# Patient Record
Sex: FEMALE | URBAN - METROPOLITAN AREA
[De-identification: names, ages, dates, MRNs, and addresses within clinical notes are randomized per-mention and may not be internally consistent; named-entity substitution may affect disease eponyms.]

---

## 2020-02-07 ENCOUNTER — ALLIED HEALTH/NURSE VISIT (OUTPATIENT)
Dept: TRANSPLANT | Facility: CLINIC | Age: 40
End: 2020-02-07
Attending: INTERNAL MEDICINE
Payer: MEDICARE

## 2020-02-07 ENCOUNTER — MEDICAL CORRESPONDENCE (OUTPATIENT)
Dept: TRANSPLANT | Facility: CLINIC | Age: 40
End: 2020-02-07

## 2020-02-07 VITALS
SYSTOLIC BLOOD PRESSURE: 112 MMHG | TEMPERATURE: 98.9 F | BODY MASS INDEX: 51.54 KG/M2 | DIASTOLIC BLOOD PRESSURE: 72 MMHG | OXYGEN SATURATION: 96 % | WEIGHT: 290.9 LBS | RESPIRATION RATE: 16 BRPM | HEIGHT: 63 IN | HEART RATE: 70 BPM

## 2020-02-07 DIAGNOSIS — D46.Z OTHER MYELODYSPLASTIC SYNDROMES (H): Primary | ICD-10-CM

## 2020-02-07 DIAGNOSIS — D46.9 MDS (MYELODYSPLASTIC SYNDROME) (H): Primary | ICD-10-CM

## 2020-02-07 DIAGNOSIS — Z71.9 VISIT FOR COUNSELING: Primary | ICD-10-CM

## 2020-02-07 PROBLEM — Z97.5 NEXPLANON IN PLACE: Status: ACTIVE | Noted: 2017-02-23

## 2020-02-07 PROBLEM — D63.0 ANEMIA IN NEOPLASTIC DISEASE: Status: ACTIVE | Noted: 2017-06-13

## 2020-02-07 PROBLEM — R52 PAIN MANAGEMENT: Status: ACTIVE | Noted: 2017-11-30

## 2020-02-07 PROBLEM — F33.0 MILD RECURRENT MAJOR DEPRESSION (H): Status: ACTIVE | Noted: 2019-04-29

## 2020-02-07 PROBLEM — N76.4 ABSCESS OF RIGHT GENITAL LABIA: Status: ACTIVE | Noted: 2018-03-20

## 2020-02-07 PROBLEM — R87.619 ABNORMAL CERVICAL PAPANICOLAOU SMEAR: Status: ACTIVE | Noted: 2017-11-14

## 2020-02-07 PROBLEM — F41.8 OTHER SPECIFIED ANXIETY DISORDERS: Status: ACTIVE | Noted: 2018-11-13

## 2020-02-07 PROBLEM — G89.18 POST-OPERATIVE PAIN: Status: ACTIVE | Noted: 2017-11-30

## 2020-02-07 PROBLEM — E83.19 HEMOSIDEROSIS: Status: ACTIVE | Noted: 2019-10-22

## 2020-02-07 PROBLEM — Z95.828 STATUS POST PICC CENTRAL LINE PLACEMENT: Status: ACTIVE | Noted: 2017-10-19

## 2020-02-07 PROCEDURE — 25000128 H RX IP 250 OP 636: Mod: ZF | Performed by: INTERNAL MEDICINE

## 2020-02-07 PROCEDURE — 40000268 ZZH STATISTIC NO CHARGES: Mod: ZF

## 2020-02-07 PROCEDURE — 81382 HLA II TYPING 1 LOC HR: CPT | Mod: XU | Performed by: INTERNAL MEDICINE

## 2020-02-07 PROCEDURE — 86828 HLA CLASS I&II ANTIBODY QUAL: CPT | Performed by: INTERNAL MEDICINE

## 2020-02-07 PROCEDURE — 81370 HLA I & II TYPING LR: CPT | Performed by: INTERNAL MEDICINE

## 2020-02-07 PROCEDURE — 81378 HLA I & II TYPING HR: CPT | Performed by: INTERNAL MEDICINE

## 2020-02-07 PROCEDURE — G0463 HOSPITAL OUTPT CLINIC VISIT: HCPCS | Mod: ZF

## 2020-02-07 PROCEDURE — 81376 HLA II TYPING 1 LOCUS LR: CPT | Performed by: INTERNAL MEDICINE

## 2020-02-07 RX ORDER — DEFEROXAMINE MESYLATE 2 G/1
INJECTION, POWDER, LYOPHILIZED, FOR SOLUTION INTRAMUSCULAR; INTRAVENOUS; SUBCUTANEOUS
COMMUNITY
Start: 2019-09-09

## 2020-02-07 RX ORDER — LOPERAMIDE HCL 2 MG
2-4 CAPSULE ORAL
COMMUNITY
Start: 2017-04-12

## 2020-02-07 RX ORDER — MORPHINE SULFATE 15 MG/1
15 TABLET, FILM COATED, EXTENDED RELEASE ORAL
COMMUNITY
Start: 2019-10-04 | End: 2020-02-07

## 2020-02-07 RX ORDER — AMOXICILLIN 250 MG
1 CAPSULE ORAL
COMMUNITY
Start: 2017-12-23

## 2020-02-07 RX ORDER — DIPHENHYDRAMINE HCL 25 MG
25-50 CAPSULE ORAL
COMMUNITY

## 2020-02-07 RX ORDER — ONDANSETRON 8 MG/1
8 TABLET, FILM COATED ORAL
COMMUNITY
Start: 2018-12-18

## 2020-02-07 RX ORDER — GABAPENTIN 300 MG/1
CAPSULE ORAL
COMMUNITY
Start: 2019-04-04

## 2020-02-07 RX ORDER — SERTRALINE HYDROCHLORIDE 100 MG/1
200 TABLET, FILM COATED ORAL
COMMUNITY
Start: 2019-06-07 | End: 2020-02-07

## 2020-02-07 RX ORDER — DIPHENOXYLATE HYDROCHLORIDE AND ATROPINE SULFATE 2.5; .025 MG/1; MG/1
1 TABLET ORAL
COMMUNITY

## 2020-02-07 RX ORDER — TRIAMCINOLONE ACETONIDE 1 MG/ML
LOTION TOPICAL
COMMUNITY
Start: 2018-07-06

## 2020-02-07 RX ORDER — PROCHLORPERAZINE MALEATE 10 MG
10 TABLET ORAL
COMMUNITY
Start: 2019-01-17

## 2020-02-07 RX ORDER — ACYCLOVIR 400 MG/1
TABLET ORAL
COMMUNITY
Start: 2019-06-19

## 2020-02-07 RX ORDER — TRAMADOL HYDROCHLORIDE 50 MG/1
50 TABLET ORAL
COMMUNITY
Start: 2019-09-09

## 2020-02-07 RX ORDER — ALBUTEROL SULFATE 90 UG/1
1-2 AEROSOL, METERED RESPIRATORY (INHALATION)
COMMUNITY
Start: 2017-01-18

## 2020-02-07 RX ORDER — TEMAZEPAM 15 MG/1
15 CAPSULE ORAL
COMMUNITY
Start: 2019-10-11

## 2020-02-07 RX ORDER — ARIPIPRAZOLE 2 MG/1
2 TABLET ORAL
COMMUNITY
Start: 2019-06-07

## 2020-02-07 RX ORDER — POSACONAZOLE 100 MG/1
TABLET, DELAYED RELEASE ORAL
COMMUNITY
Start: 2018-07-23 | End: 2020-02-07

## 2020-02-07 RX ORDER — NARATRIPTAN 2.5 MG/1
2.5 TABLET ORAL
COMMUNITY
Start: 2019-04-15

## 2020-02-07 RX ORDER — CETIRIZINE HYDROCHLORIDE 10 MG/1
TABLET ORAL
COMMUNITY
Start: 2019-08-06

## 2020-02-07 RX ORDER — MULTIVITAMIN
1 CAPSULE ORAL
COMMUNITY
End: 2020-02-07

## 2020-02-07 RX ORDER — LANOLIN ALCOHOL/MO/W.PET/CERES
100 CREAM (GRAM) TOPICAL
COMMUNITY

## 2020-02-07 RX ORDER — TOPIRAMATE 50 MG/1
75 TABLET, FILM COATED ORAL 2 TIMES DAILY
Refills: 0 | COMMUNITY
Start: 2020-02-07

## 2020-02-07 RX ORDER — HEPARIN SODIUM (PORCINE) LOCK FLUSH IV SOLN 100 UNIT/ML 100 UNIT/ML
5 SOLUTION INTRAVENOUS EVERY 8 HOURS
Status: DISCONTINUED | OUTPATIENT
Start: 2020-02-07 | End: 2020-02-10 | Stop reason: HOSPADM

## 2020-02-07 RX ORDER — TRAZODONE HYDROCHLORIDE 100 MG/1
TABLET ORAL
COMMUNITY
Start: 2019-06-07

## 2020-02-07 RX ORDER — LACTOBACILLUS RHAMNOSUS GG 10B CELL
CAPSULE ORAL
COMMUNITY

## 2020-02-07 RX ORDER — CLINDAMYCIN PHOSPHATE 11.9 MG/ML
SOLUTION TOPICAL
COMMUNITY
Start: 2008-01-24

## 2020-02-07 RX ORDER — BENZOYL PEROXIDE 5 G/100G
GEL TOPICAL
COMMUNITY
Start: 2019-02-04

## 2020-02-07 RX ORDER — LANOLIN ALCOHOL/MO/W.PET/CERES
100 CREAM (GRAM) TOPICAL
COMMUNITY
End: 2020-02-07

## 2020-02-07 RX ORDER — GLUCOSAMINE HCL 500 MG
1 TABLET ORAL
COMMUNITY
Start: 2020-01-22 | End: 2020-02-07

## 2020-02-07 RX ORDER — ADAPALENE 45 G/G
1 GEL TOPICAL
COMMUNITY
Start: 2008-01-24

## 2020-02-07 RX ORDER — DIPHENHYDRAMINE HYDROCHLORIDE 25 MG/1
25 TABLET ORAL
COMMUNITY
Start: 2014-04-17

## 2020-02-07 RX ORDER — LIDOCAINE/PRILOCAINE 2.5 %-2.5%
CREAM (GRAM) TOPICAL
COMMUNITY
Start: 2019-08-02

## 2020-02-07 RX ORDER — LORAZEPAM 0.5 MG/1
0.5 TABLET ORAL
COMMUNITY
Start: 2019-10-11

## 2020-02-07 RX ORDER — TOPIRAMATE 50 MG/1
75 TABLET, FILM COATED ORAL
COMMUNITY
Start: 2019-03-19 | End: 2020-02-07

## 2020-02-07 RX ADMIN — HEPARIN SODIUM (PORCINE) LOCK FLUSH IV SOLN 100 UNIT/ML 5 ML: 100 SOLUTION at 16:00

## 2020-02-07 ASSESSMENT — PAIN SCALES - GENERAL: PAINLEVEL: MODERATE PAIN (4)

## 2020-02-07 ASSESSMENT — MIFFLIN-ST. JEOR: SCORE: 1971.64

## 2020-02-07 NOTE — NURSING NOTE
"Oncology Rooming Note    February 7, 2020 1:27 PM   Eleanor Mckeon is a 39 year old female who presents for:    Chief Complaint   Patient presents with     Consult     New pt eval- Other myelodysplastic syndromes      Initial Vitals: /72 (BP Location: Right arm, Patient Position: Sitting, Cuff Size: Adult Large)   Pulse 70   Temp 98.9  F (37.2  C) (Oral)   Resp 16   Ht 1.605 m (5' 3.19\")   Wt 132 kg (290 lb 14.4 oz)   SpO2 96%   BMI 51.22 kg/m   Estimated body mass index is 51.22 kg/m  as calculated from the following:    Height as of this encounter: 1.605 m (5' 3.19\").    Weight as of this encounter: 132 kg (290 lb 14.4 oz). Body surface area is 2.43 meters squared.  Moderate Pain (4) Comment: Data Unavailable   No LMP recorded.  Allergies reviewed: Yes  Medications reviewed: Yes    Medications: Medication refills not needed today.  Pharmacy name entered into EPIC: Data Unavailable    Clinical concerns: N/A      Erin Calderon CMA              "

## 2020-02-07 NOTE — PROGRESS NOTES
Date: Feb 7, 2020    Referring Physician:   Cathleen Chen, MD [facility]  [address] [place] [address]     CC: transplant consult for MDS    HPI:     38 yo lady with history of MDS. She reports that in 2016, she started having low blood counts. At that point, she had a work-up that ended up in bone marrow biopsy. She was supposedly hypocellular with normal cytogenetics but had an SF3B1 mutation with 70% ringed sideroblasts.    12/29/16 showed hypocellular with ~40% cellularity, with adequate erythropoiesis and  granulopoiesis, normal to slightly decreased megakaryopoiesis and megaloblastic maturation. Markedly increased stainable storage iron with 70% ring sideroblasts. No abnormal cytogenetics, +SF3B1.    Cycle #1 of 5-AZA 75mg/m2 daily x 7 days, given 02/27/2017 to 03/05/2017  Course complicated by prolonged neutropenic fever, pharyngitis. Hospitalized between 03/23/2017 to 04/10/2017.    Cycle #2 of 5- AZA 75mg/m2 daily x 7 days, given 03/31/2017 to 04/06/2017  Course complicated by neutropenic fever, sepsis due to Right labial abscess/cellulitis with cultures positive for Carbapenem resistant Klebsiella, requiring I&D x 2 on 04/27/2017 and 05/02/2017, C. Diff negative diarrhea, prolonged antibiotics and hospitalization, wound care and healing. Hospitalized between 04/25/2017 to 05/11/2017     Cycle #3 of 5-AZA 75mg/m2 daily x 5 days, given 06/27/2017 to 07/01/2017  A. Course complicated by neutropenic fever, Klebsiella pneumoniae sepsis of unknown source, elevated LFTs. Hospitalizated between 07/16/2017 to 07/21/2017    Cycle #4 of 5-AZA 75mg/m2 daily x 5 days, give 08/15/2017 to 08/19/2017, with no response  Course complicated by neutropenic fever, Left sided pharyngitis. Hospitalized between 09/4/2017 to 09/08/2017.    Bone marrow biopsy:  9/27/17 showed hypocellularity with 10-15% cellularity, increased storage and sideroblastic iron with rare ringed sideroblasts. No  morphologic evidence of significant dyspoiesis was noted. No increase in blasts was identified. Cytogenetics showed del 13q, but no other cytogenetic abnormalities.    Cycle #5 of 5-AZA 75mg/m2 daily x 3 days, given 11/19/2017 to 11/21/2017, with no response  Course complicated by:  I) Neutropenic fever, recurrent Right labial abscess requiring I&D on 11/27/17, broad spectrum antibiotic/antifungal support. Hospitalized between 11/24/2017 to 12/2/2017.  II) Right Calf abscess, developed 12/13/2017, requiring I&D 01/08/2018 and subsequent debridement on 01/18/2018, prolonged IV antibiotic therapy as outpatient.    Cycle #6 of 5-AZA 75mg/m2 daily x 3 days, given 02/14/2018 to 02/16/2018, with no response  A. Course complicated by Left labial abscess, Hospitalized between 03/20/2018 to 03/25/2018    Lenalidomide 10mg daily, discontinued due to worsening pancytopenia with associated neutropenic sepsis, 06/2018 to 07/2018    Bone Marrow Biopsy:  5/8/19 showed: Slightly hypercellular bone marrow for age (approximately 70% cellular overall) displaying adequate erythropoiesis, granulopoiesis and megakaryopoiesis. Mild trilineage dysplasia. Less than 1% bone marrow myeloblasts. Markedly increased stainable storage iron with approximately 3% ring sideroblasts. No circulating myeloblasts identified. Cytogenetics showed del 13q, but no other cytogenetic abnormalities.     A. Myeloid malignancies mutation panel by next generation sequencing showed:   1) ASXL1 c.2838dup, p.Mmb320* (NM_[id number]) Variant Frequency: 30.3%   2) SF3B1 c.1984C>T, p.Gop740Arj (NM_[id number]) Variant Frequency: 5.6%   3) TP53 c.1079G>C, p.Hei365Fbu (NM_000546.5) Variant Frequency: 45.8%   4) BCORL1 c.3350A>G, p.Rsv8212Krn (NM_[id number]) Variant Frequency: 50.0% (Unknown significance)  5) KMT2A c.1981G>A, p.Lrf493Muz (NM_[id number]) Variant Frequency: 48.6% (Unknown significance)    Today:  The only reason I copied the above is to show 2 things: 1. Clonal  evolution and 2. Her significant side effects with chemotherapy.  Her dad is in Hospice care at the moment passing away with heart failure. She and her mom came to the appointment but were frequently tearful during. She reports her energy is low now. She takes some naps throughout the day but she doesn't sleep through the night. She wakes up at 4 and stays awake, then takes some naps here and there. 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 Her appetite is preserved. No fevers. No rashes. No lumps or bumps. No other complaints.    ROS: A 14-point ROS has been performed and is negative unless otherwise specified in the HPI.    PMH:  1. 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 Markedly improved Pancytopenia with ongoing mild thrombocytopenia since 12/2018  3. B12 Deficiency  4. Secondary Iron overload due to multiple transfusions  5. 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 S/P Neutropenic infections as noted below  7. Hypertension  8. Asthma  9. S/P Gastric Bypass, Ruth-En-Y for weight reduction  10. Nexplanon implant in place to control menorrhagia  11. LOLA  12. Idiopathic hypersomnia    PSH:  - Ruth en Y  - Left elbow surgery  - Labial I/D x4, most recently 3/2018  - Skin infection  - D&C in 2017    SH:  Social History     Tobacco Use     Smoking status: Former Smoker     Smokeless tobacco: Never Used   Substance Use Topics     Alcohol use: Yes     Drug use: Never      - E/T/I: quit smoking in 2006. smoked for about 5 years. drinks 1-2 beers every other day. no drugs  - Sib/parents/children Info: brother. no children.  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    FH:  Family History   Problem Relation Age of Onset     Uterine Cancer Maternal Grandmother      Prostate Cancer Maternal Grandfather      Leukemia Maternal Great-Grandfather      ALL:     Allergies    Allergen Reactions     Penicillins Hives     Other reaction(s): Hives  Tolerated cefepime during 9/5/17 admission. Tolerated cefdinir 9/8/17.     As a young child  Tolerated cefepime during 9/5/17 admission. Tolerated cefdinir 9/8/17.     As a young child       Deferasirox Itching and Rash     Gadobutrol Nausea and Vomiting     Nausea and vomiting with administration on TRACEY.  Nausea and vomiting with administration on TRACEY.       Cat Hair Extract Itching and Other (See Comments)     PN: Cat Dander-itchy eyes, sneezing  wheezing       Raspberry Hives     Other reaction(s): Edema,generalized  PN: Facial Swelling  PN: Facial Swelling       Meropenem Rash     Tolerated cefepime during 9/5/17 admission  Tolerated cefepime during 9/5/17 admission       Medications:  Current Outpatient Medications   Medication     acyclovir (ZOVIRAX) 400 MG tablet     adapalene (DIFFERIN) 0.1 % external gel     albuterol (PROAIR HFA/PROVENTIL HFA/VENTOLIN HFA) 108 (90 Base) MCG/ACT inhaler     ARIPiprazole (ABILIFY) 2 MG tablet     benzoyl peroxide 5 % topical gel     biotin 5 MG CAPS     calcium citrate and vitamin D (CITRACAL) 200-250 MG-UNIT TABS per tablet     cetirizine (SM ALL DAY ALLERGY) 10 MG tablet     cholecalciferol (VITAMIN D3) 125 MCG (5000 UT) TABS tablet     clindamycin (CLEOCIN T) 1 % external solution     Cyanocobalamin 1000 MCG/ML KIT     deferoxamine (DESFERAL) 2 g SOLR     diphenhydrAMINE (BENADRYL) 25 MG capsule     etonogestrel (IMPLANON/NEXPLANON) 68 MG IMPL     gabapentin (NEURONTIN) 300 MG capsule     Lactobacillus-Inulin ([facility] DIGEST[facility] HEALTH) CAPS     lidocaine-prilocaine (EMLA) 2.5-2.5 % external cream     loperamide (IMODIUM) 2 MG capsule     LORazepam (ATIVAN) 0.5 MG tablet     Multiple Vitamin (MULTI-VITAMINS) TABS     naloxone (NARCAN) 4 MG/0.1ML nasal spray     naratriptan (AMERGE) 2.5 MG tablet     ondansetron (ZOFRAN) 8 MG tablet     phentermine (LOMAIRA) 8 MG tablet     prochlorperazine  "(COMPAZINE) 10 MG tablet     senna-docusate (SENOKOT-S/PERICOLACE) 8.6-50 MG tablet     temazepam (RESTORIL) 15 MG capsule     topiramate (TOPAMAX) 50 MG tablet     traMADol (ULTRAM) 50 MG tablet     traZODone (DESYREL) 100 MG tablet     triamcinolone (KENALOG) 0.1 % external lotion     vitamin B1 (THIAMINE) 100 MG tablet     vortioxetine (TRINTELLIX/BRINTELLIX) 20 MG tablet     Current Facility-Administered Medications   Medication     heparin 100 UNIT/ML injection 5 mL       Physical Exam:  Vitals: /72 (BP Location: Right arm, Patient Position: Sitting, Cuff Size: Adult Large)   Pulse 70   Temp 98.9  F (37.2  C) (Oral)   Resp 16   Ht 1.605 m (5' 3.19\")   Wt 132 kg (290 lb 14.4 oz)   SpO2 96%   BMI 51.22 kg/m    GA: NAD, appears as stated age  HEENT: EOMI, PERRL, OP clear  Neck: Supple, no LAD  CV: RRR, no m/r/g  Lungs: Clear, no w/c/r  Abd: BS+, soft, NT, ND  Ext: warm and well perfused, trace edema  Neuro: AAO, moves all extremities  Lymphatics: no palpable cervical, axillary, or inguinal LAD. No HSM  Skin: No acute rashes, no lesions  Psyc: appropriate, reactive, tearful    ECOG PS: 1    Labs, pathology and other diagnostics reviewed    12/29/2016 marrow     1. Moderately hypocellular bone marrow with moderately decreased granulopoiesis and megakaryopoiesis, and slightly decreased erythropoiesis with 65% ring sideroblasts. Pancytopenia. See  comment.   2.  Small PNH clone identified by outside flow cytometry studies.  3.  No morphologic or immunophenotypic features of involvement by lymphoma.        Diagnosis Comment    There is a sideroblastic anemia with approximately 65% ring sideroblasts of the erythroid precursors.  Secondary causes of ring sideroblasts including ethanol use, toxin/heavy metal exposure, medication, zinc toxicity, copper deficiency and congenital sideroblastic anemia should be excluded.  If these are excluded, then the overall findings may represent involvement by a low grade " myelodysplastic syndrome with single lineage dysplasia with ring sideroblasts (MDS-SLD-RS).  Sequencing for SF3B1 mutation may be considered if clinically indicated.       1/19/17  DIAGNOSIS:    Peripheral blood, bone marrow aspirate and biopsy, iliac crest:    1.  Slightly hypocellular bone marrow with mild panhypoplasia.    2.  Erythroid precursors showing slight dyserythropoiesis with 50% ring sideroblasts on iron stain.    3.  Slight minimal dysmegakaryopoiesis with occasional osteoclast-like megakaryocytes.  See comment.    Cytogenetic analysis, bone marrow aspirate, submitted report: Normal female karyotype.  46,XX[20].          Paroxysmal nocturnal hemoglobinuria testing by flow cytometry, peripheral blood, submitted report (collected 12/29/16): Subclinical red blood cell and white blood cell PNH clone identified.  % PNE Monocytes:  0.412;  % PNH PMN:  0.595; and  % PNH RBC:  0.088.    Molecular Testing: SF3B1 (27%) and TET2 46% VUS    Bone marrow biopsy:  9/27/17 showed hypocellularity with 10-15% cellularity, increased storage and sideroblastic iron with rare ringed sideroblasts. No morphologic evidence of significant dyspoiesis was noted. No increase in blasts was identified. Cytogenetics showed del 13q, but no other cytogenetic abnormalities.    5/8/2019  Patient with previously diagnosed myelodysplastic syndrome with unilineage dysplasia and ring sideroblasts undergoes bone marrow biopsy showing:      Slightly hypercellular bone marrow for age (approximately 70% cellular overall) displaying adequate erythropoiesis, granulopoiesis and megakaryopoiesis.    Mild trilineage dysplasia.    Less than 1% bone marrow myeloblasts.    Markedly increased stainable storage iron with approximately 3% ring sideroblasts.    Macrocytic red cells with increased anisopoikilocytosis and no evidence of hemolysis.    Mild leukopenia with absolute neutropenia and lymphopenia.    No circulating myeloblasts identified.     Moderate thrombocytopenia    Cytogenetics:  46,XX,del(l3)(q14q22)[18]/46,XX[2] Abnormal female karyotype    A. Myeloid malignancies mutation panel by next generation sequencing showed:   1) ASXL1 c.2838dup, p.Ewp065* (NM_[id number]) Variant Frequency: 30.3%   2) SF3B1 c.1984C>T, p.Epz524Nyx (NM_[id number]) Variant Frequency: 5.6%   3) TP53 c.1079G>C, p.Pgp213Tzq (NM_000546.5) Variant Frequency: 45.8%   4) BCORL1 c.3350A>G, p.Imb8581Vxb (NM_[id number]) Variant Frequency: 50.0% (Unknown significance)  5) KMT2A c.1981G>A, p.Iir273Ehi (NM_[id number]) Variant Frequency: 48.6% (Unknown significance)      ANC on that date=1.2    A/P    1. MDS or other bone marrow failure disorder   - For MDS, her R-IPSS is 4.5 (2 for CG, rest for counts based on her pretreatment CBC copied above). However, given her young age at diagnosis, her clonal evolution with time and minimal chemotherapy, and all the complications she endured while on 5-aza, mentioned of a small PNH clone on the marrow done at [place], and the fact that she started hypocellular, I am more concerned about an inherited/somatic bone marrow failure disorder. We have a Fanconi Anemia genetic panel here but [facility] offers a more comprehensive and myeloid tailored panel. I think it's very important to check for that since some the DNA repair disorders need a lower intensity conditioning prior to transplant and radiation must be avoided in those scenarios. We will go ahead and type her today. We also consented her to the BMT CTN 1702 study (CTRL-ALT-D). She is tri-racial and would probably fall into the very unlikely group of getting a MUD.    HCT-CI = 2 for obesity and anxiety.  DRI = Intermediate    Plan:  - I will discuss her further in our BMT Tumor board  - I will get in touch with Dr. Susan Guerra to see if she is willing to get the UC panel done through her office or if we should arrange it here.    I spent 60 minutes with Eleanor Mckeon with more than 30  minutes in direct face-to-face counseling and coordination of care.

## 2020-02-10 DIAGNOSIS — D46.Z OTHER MYELODYSPLASTIC SYNDROMES (H): Primary | ICD-10-CM

## 2020-02-10 LAB
SCR 1 TEST METHOD: NORMAL
SCR1 CELL: NORMAL
SCR1 COMMENTS: NORMAL
SCR1 RESULT: NORMAL
SCR2 CELL: NORMAL
SCR2 COMMENTS: NORMAL
SCR2 RESULT: NORMAL
SCR2 TEST METHOD: NORMAL

## 2020-02-10 NOTE — PROGRESS NOTES
Met with patient and mother following Geovani Mendoza MD BMT New Evaluation visit.  Discussed the role of Nurse Coordinator and provided patient with business cards for [facility] Bell (BMT ) to be used should additional questions arise.  Signed consent for 2019-18.  Signed Financial Release for URD search.  PRA and HLA ordered.  All questions and concerns addressed, no further questions at this time.  Ester Coffman RN, BSN, OCN  Nurse Coordinator, Adult BMT  [phone]

## 2020-02-11 NOTE — PROGRESS NOTES
"BMT Clinical Social Work New Transplant Evaluation    Information for this evaluation on 02/07/20 was collected via Eleanor \"Fidelina\" (pt) and her mother's report, consultation with medical team, and medical chart review.     Present:  Patient: Eleanor \"Fidelina\" Mike  Pt's mother: Susan Mckeon   Clinical  (CSW):LU Huizar, [facility]    Medical Team:   Nurse Coordinator: Ester Coffman RN  BMT Physician: Geovani Mendoza MD  Referring Physician: Nicol Guerra MD      With whom does pt live: Pt lives with her parents and her brother     Relocation Requirement: Pt lives in [place] and will not be required to relocate    Diagnosis: Myelodysplastic Syndrome (MDS)    Date of Diagnosis: 01/27/17    Transplant Type: Allogeneic    Special Needs:  No needs identified at this time.     Family Constellation:   Spouse: NA  Children: NA  Parents: mother (Susan); father is currently in multiple organ failure in [facility] ICU.   Siblings: 1 brother     Employment  Currently employed: No; on L[employer]  Occupation: Pt previously worked as a [employer]    Source of Income: [employer] and L[employer] benefits     Finances/Insurance:  Pt expressed some financial concern. CSW provided her with [employer] application and discussed financial grants available to transplant patients.     CSW provided information regarding the insurance authorization process and the role of the BMT financial case-mangers. CSW provided contact info for the BMT financial case-managers and referred pt to them for future insurance questions.     Caregiver:  CSW discussed the caregiver role and expectation at length. Fidelina is agreeable to having a full time caregiver for a minimum of 100 days until cleared by the BMT physician. Fidelina's identified caregivers are her mom and brother, with whom she lives. Caregiver education and information provided. No caregiver concerns identified.     Healthcare Directive:  No. CSW provided education and forms. CSW encouraged Fidelina to have " discussions with their family regarding their health care wishes.     Resources Provided:  BMT Information Book   BMT Resources Packet:   Healthcare Directive:   Tour of Unit NO   Transplant unit description and information provided.     Identified Concerns:     Fidelina's father was dying in the ICU of multiple organ failure at the time of the NT. His decline was not anticipated by the family. Patient was understandably upset during the NT. She insisted on hearing all the information, as she says it's what her father would want.      Summary:   Fidelina presents to [facility] for consultation regarding an allogeneic stem cell transplant. Fidelina and her mother asked good questions regarding psychosocial factors related to BMT; all of which were answered. Fidelina presented as emotional and overwhelmed, as her father was dying in the ICU at the time of the NT. Fidelina's affect was labile and emotional. Her mother presented as engaged and supportive.       Plan:   CSW provided contact information and encouraged Fidelina to contact CSW with questions, concerns, resources and for support. CSW will continue to follow Pt to provide supportive counseling and assistance with resources as needed.      LU Huizar, [facility]  BMT    [phone]

## 2020-02-12 LAB
A* LOCUS: NORMAL
A*: NORMAL
ABTEST METHOD: NORMAL
B* LOCUS: NORMAL
B*: NORMAL
BW-1: NORMAL
C* LOCUS: NORMAL
C*: NORMAL
DPA1* LOCUS NMDP: NORMAL
DPA1* NMDP: NORMAL
DPA1*: NORMAL
DPA1*LOCUS: NORMAL
DPB1* LOCUS NMDP: NORMAL
DPB1* NMDP: NORMAL
DPB1*: NORMAL
DPB1*LOCUS: NORMAL
DQA1*: NORMAL
DQA1*LOCUS: NORMAL
DQB1* LOCUS: NORMAL
DQB1*: NORMAL
DRB1* LOCUS: NORMAL
DRB1*: NORMAL
DRB4* LOCUS: NORMAL
DRSSO TEST METHOD: NORMAL

## 2020-02-14 LAB
ASBTTEST METHOD: NORMAL
DRSBTTEST METHOD: NORMAL
SBTA* LOCUS NMDP: NORMAL
SBTA* LOCUS: NORMAL
SBTA* NMDP - FCIS: NORMAL
SBTA*: NORMAL
SBTB* LOCUS NMDP: NORMAL
SBTB* LOCUS: NORMAL
SBTB*: NORMAL
SBTC* LOCUS: NORMAL
SBTC*: NORMAL
SBTDQB1*: NORMAL
SBTDQB1*LOCUS: NORMAL
SBTDQB1*NMDP: NORMAL
SBTDRB1* LOCUS - FCIS: NORMAL
SBTDRB1* NMDP: NORMAL
SBTDRB1*: NORMAL

## 2020-02-20 ENCOUNTER — APPOINTMENT (OUTPATIENT)
Dept: LAB | Facility: CLINIC | Age: 40
End: 2020-02-20
Attending: INTERNAL MEDICINE
Payer: MEDICARE

## 2020-02-20 PROCEDURE — 81376 HLA II TYPING 1 LOCUS LR: CPT | Mod: XU | Performed by: INTERNAL MEDICINE

## 2020-02-20 PROCEDURE — 81370 HLA I & II TYPING LR: CPT | Performed by: INTERNAL MEDICINE

## 2020-05-07 ENCOUNTER — CARE COORDINATION (OUTPATIENT)
Dept: TRANSPLANT | Facility: CLINIC | Age: 40
End: 2020-05-07

## 2020-05-07 NOTE — PROGRESS NOTES
Received phone call from Dr. Susan Guerra (RMD at [facility]) regarding how to order the Inherited Bone Marrow Failures Panel testing that was recommended at the BMT NT visit with Dr. Geovani Mendoza on 2/7/2020.  Directed Dr. Guerra to the [facility] website for further information on testing specifications and submission instructions/inquiries.  Website as follows: [email or web address]